# Patient Record
Sex: MALE | Race: WHITE | NOT HISPANIC OR LATINO | Employment: OTHER | URBAN - METROPOLITAN AREA
[De-identification: names, ages, dates, MRNs, and addresses within clinical notes are randomized per-mention and may not be internally consistent; named-entity substitution may affect disease eponyms.]

---

## 2024-05-30 PROBLEM — M51.26 LUMBAR HERNIATED DISC: Status: ACTIVE | Noted: 2024-05-30

## 2024-05-30 PROBLEM — M54.16 LUMBAR RADICULOPATHY: Status: ACTIVE | Noted: 2024-05-30

## 2024-05-30 PROBLEM — M47.816 LUMBAR FACET ARTHROPATHY: Status: ACTIVE | Noted: 2024-05-30

## 2024-05-30 PROBLEM — I10 HYPERTENSION: Status: ACTIVE | Noted: 2024-05-30

## 2024-05-30 PROBLEM — M79.18 MYOFASCIAL PAIN: Status: ACTIVE | Noted: 2024-05-30

## 2024-05-30 PROBLEM — M54.50 LOWER BACK PAIN: Status: ACTIVE | Noted: 2024-05-30

## 2024-06-05 ENCOUNTER — OFFICE VISIT (OUTPATIENT)
Dept: ORTHOPEDIC SURGERY | Facility: CLINIC | Age: 69
End: 2024-06-05
Payer: MEDICARE

## 2024-06-05 ENCOUNTER — HOSPITAL ENCOUNTER (OUTPATIENT)
Dept: RADIOLOGY | Facility: CLINIC | Age: 69
Discharge: HOME | End: 2024-06-05
Payer: MEDICARE

## 2024-06-05 VITALS — BODY MASS INDEX: 31.76 KG/M2 | WEIGHT: 214.95 LBS

## 2024-06-05 DIAGNOSIS — R52 PAIN: ICD-10-CM

## 2024-06-05 DIAGNOSIS — S83.8X1A SPRAIN OF MEDIAL MENISCUS OF RIGHT KNEE, INITIAL ENCOUNTER: ICD-10-CM

## 2024-06-05 DIAGNOSIS — M17.11 PRIMARY OSTEOARTHRITIS OF RIGHT KNEE: ICD-10-CM

## 2024-06-05 DIAGNOSIS — M25.561 RIGHT KNEE PAIN, UNSPECIFIED CHRONICITY: Primary | ICD-10-CM

## 2024-06-05 PROCEDURE — 2500000004 HC RX 250 GENERAL PHARMACY W/ HCPCS (ALT 636 FOR OP/ED): Performed by: ORTHOPAEDIC SURGERY

## 2024-06-05 PROCEDURE — 73560 X-RAY EXAM OF KNEE 1 OR 2: CPT | Mod: RIGHT SIDE | Performed by: ORTHOPAEDIC SURGERY

## 2024-06-05 PROCEDURE — 99203 OFFICE O/P NEW LOW 30 MIN: CPT | Performed by: ORTHOPAEDIC SURGERY

## 2024-06-05 PROCEDURE — 1160F RVW MEDS BY RX/DR IN RCRD: CPT | Performed by: ORTHOPAEDIC SURGERY

## 2024-06-05 PROCEDURE — 1159F MED LIST DOCD IN RCRD: CPT | Performed by: ORTHOPAEDIC SURGERY

## 2024-06-05 PROCEDURE — 2500000005 HC RX 250 GENERAL PHARMACY W/O HCPCS: Performed by: ORTHOPAEDIC SURGERY

## 2024-06-05 PROCEDURE — 99213 OFFICE O/P EST LOW 20 MIN: CPT | Performed by: ORTHOPAEDIC SURGERY

## 2024-06-05 PROCEDURE — 1125F AMNT PAIN NOTED PAIN PRSNT: CPT | Performed by: ORTHOPAEDIC SURGERY

## 2024-06-05 PROCEDURE — 73560 X-RAY EXAM OF KNEE 1 OR 2: CPT | Mod: RT

## 2024-06-05 PROCEDURE — 20610 DRAIN/INJ JOINT/BURSA W/O US: CPT | Performed by: ORTHOPAEDIC SURGERY

## 2024-06-05 RX ORDER — SILDENAFIL 100 MG/1
50-100 TABLET, FILM COATED ORAL
COMMUNITY
Start: 2024-03-19

## 2024-06-05 RX ORDER — SERTRALINE HYDROCHLORIDE 100 MG/1
100 TABLET, FILM COATED ORAL
COMMUNITY
Start: 2023-06-07 | End: 2024-06-06

## 2024-06-05 RX ORDER — LIDOCAINE HYDROCHLORIDE 10 MG/ML
1 INJECTION INFILTRATION; PERINEURAL
Status: COMPLETED | OUTPATIENT
Start: 2024-06-05 | End: 2024-06-05

## 2024-06-05 RX ORDER — LISINOPRIL 10 MG/1
10 TABLET ORAL
COMMUNITY
Start: 2023-06-07 | End: 2024-06-06

## 2024-06-05 RX ORDER — TRIAMCINOLONE ACETONIDE 40 MG/ML
40 INJECTION, SUSPENSION INTRA-ARTICULAR; INTRAMUSCULAR
Status: COMPLETED | OUTPATIENT
Start: 2024-06-05 | End: 2024-06-05

## 2024-06-05 RX ADMIN — TRIAMCINOLONE ACETONIDE 40 MG: 400 INJECTION, SUSPENSION INTRA-ARTICULAR; INTRAMUSCULAR at 14:45

## 2024-06-05 RX ADMIN — LIDOCAINE HYDROCHLORIDE 1 ML: 10 INJECTION, SOLUTION INFILTRATION; PERINEURAL at 14:45

## 2024-06-05 ASSESSMENT — PATIENT HEALTH QUESTIONNAIRE - PHQ9
1. LITTLE INTEREST OR PLEASURE IN DOING THINGS: NOT AT ALL
SUM OF ALL RESPONSES TO PHQ9 QUESTIONS 1 AND 2: 0
2. FEELING DOWN, DEPRESSED OR HOPELESS: NOT AT ALL

## 2024-06-05 ASSESSMENT — PAIN DESCRIPTION - DESCRIPTORS: DESCRIPTORS: SHARP

## 2024-06-05 ASSESSMENT — PAIN SCALES - GENERAL: PAINLEVEL_OUTOF10: 8

## 2024-06-05 ASSESSMENT — ENCOUNTER SYMPTOMS
DEPRESSION: 0
OCCASIONAL FEELINGS OF UNSTEADINESS: 0
LOSS OF SENSATION IN FEET: 0

## 2024-06-05 ASSESSMENT — PAIN - FUNCTIONAL ASSESSMENT: PAIN_FUNCTIONAL_ASSESSMENT: 0-10

## 2024-06-05 ASSESSMENT — LIFESTYLE VARIABLES: TOTAL SCORE: 0

## 2024-06-05 NOTE — PROGRESS NOTES
Subjective      Chief Complaint   Patient presents with    Right Knee - Pain        No surgery found     HPI  This 68 year old patient presents today with  right knee pain. The patient states that this right knee pain has been present for a year. He denies trauma or injury. The patient rates the knee pain as 8. The patient states that knee pain is worse with and aggravated by activity and bearing weight. The patient states the knee is giving way and locking.The patient has tried ibuprofen and tylenol with no relief. Patient requests a discussion of further treatment options on examination today.     CARDIOLOGY:   Negative for chest pain, shortness of breath.   RESPIRATORY:   Negative for chest pain, shortness of breath.   MUSCULOSKELETAL:   See HPI for details.   NEUROLOGY:   Negative for tingling, numbness, weakness.    Objective    There were no vitals filed for this visit.    Knee Exam  Constitutional: Appears stated age. No apparent distress  Labored Breathing: No  Psychiatric: Normal mood and effect.   Neurological: alert and oriented x3  Skin: intact  MUSCULOSKELETAL: Neck: No tenderness. No pain or limitation with range of motion. Back: No tenderness. Straight leg test negative bilaterally. right knee: There is diffuse tenderness over the knee. full range of motion McMurrays is positive. Anterior drawer and lachmans are negative. There is not an effusion present. The knee is stable to valgus and varus stressing. The patient walks with a painful gait favoring the right knee while walking.    Standing AP x-rays of both knees and lateral x-rays of both knees done and read in office today show mild osteoarthritis of the right knee.     Patient ID: Matt Wallis is a 68 y.o. male.    L Inj/Asp: R knee on 6/5/2024 2:45 PM  Indications: pain  Details: 22 G needle, medial approach  Medications: 40 mg triamcinolone acetonide 40 mg/mL; 1 mL lidocaine 10 mg/mL (1 %)  Outcome: tolerated well, no immediate  complications  Procedure, treatment alternatives, risks and benefits explained, specific risks discussed. Immediately prior to procedure a time out was called to verify the correct patient, procedure, equipment, support staff and site/side marked as required. Patient was prepped and draped in the usual sterile fashion.         Matt was seen today for pain.  Diagnoses and all orders for this visit:  Right knee pain, unspecified chronicity (Primary)  Primary osteoarthritis of right knee  Sprain of medial meniscus of right knee, initial encounter  Options are discussed with the patient in detail. The patient is instructed regarding activity modification, ice, physician directed at home gentle strengthening and ROM exercises, and the appropriate use of Tylenol as needed for pain with its potential adverse reactions and side effects. The patient understands. The patient states that despite all the treatment listed above that this right knee pain is debilitating and  requests a discussion of further options. Cortisone injection to the right knee is discussed in the office today. This is done in the office today. See procedures below. Return in approximately 3 months for reevaluation or sooner as needed, Please note that this report has been produced using speech recognition software.  It may contain errors related to grammar, punctuation or spelling.  Electronically signed, but not reviewed.       Galindo Camejo MD

## 2024-06-05 NOTE — PATIENT INSTRUCTIONS
Thank you for coming to see us today!     Continue to use tylenol for pain control.   Rest, ice and elevate and remember to do exercises.     Follow up  in 4 weeks for MRI evaluation or sooner as needed

## 2024-10-09 ENCOUNTER — APPOINTMENT (OUTPATIENT)
Dept: ALLERGY | Facility: CLINIC | Age: 69
End: 2024-10-09
Payer: MEDICARE

## 2024-11-20 ENCOUNTER — APPOINTMENT (OUTPATIENT)
Dept: ALLERGY | Facility: CLINIC | Age: 69
End: 2024-11-20
Payer: MEDICARE

## 2025-01-15 ENCOUNTER — APPOINTMENT (OUTPATIENT)
Dept: ALLERGY | Facility: CLINIC | Age: 70
End: 2025-01-15
Payer: MEDICARE

## 2025-01-15 VITALS
HEART RATE: 96 BPM | BODY MASS INDEX: 31.48 KG/M2 | WEIGHT: 213 LBS | SYSTOLIC BLOOD PRESSURE: 134 MMHG | DIASTOLIC BLOOD PRESSURE: 86 MMHG | OXYGEN SATURATION: 98 %

## 2025-01-15 DIAGNOSIS — J32.8 OTHER CHRONIC SINUSITIS: ICD-10-CM

## 2025-01-15 DIAGNOSIS — J31.0 CHRONIC RHINITIS: Primary | ICD-10-CM

## 2025-01-15 PROCEDURE — 99204 OFFICE O/P NEW MOD 45 MIN: CPT | Performed by: ALLERGY & IMMUNOLOGY

## 2025-01-15 PROCEDURE — 95004 PERQ TESTS W/ALRGNC XTRCS: CPT | Performed by: ALLERGY & IMMUNOLOGY

## 2025-01-15 RX ORDER — IPRATROPIUM BROMIDE 42 UG/1
1-2 SPRAY, METERED NASAL 2 TIMES DAILY PRN
Qty: 15 ML | Refills: 11 | Status: SHIPPED | OUTPATIENT
Start: 2025-01-15 | End: 2025-02-05

## 2025-01-15 NOTE — PROGRESS NOTES
Subjective   Patient ID:   [id number]   Matt Wallis is a 69 y.o. male who presents for Allergy Testing.    Chief Complaint   Patient presents with    Allergy Testing          HPI  This patient is here to evaluate for:  Runny eyes, post nasal drainage,   With barometric pressure, he had congestion and post nasal drainage    Started for 10+ years.     Medications that have not helped include: claritin, benadryl, allergra, zyrtec,   No help with flonase.     ?astepro or nasacort.    Watery eyes, has seen the eye doctor.  Opened up his tear ducts - helped a lot but not fully.    Sh: no woodworking or dust exposure  He cuts grass and is outdoors working on his 1 acre lawn.  Smokes 1 1/2 ppd - he is thinking about quitting      Review of Systems   All other systems reviewed and are negative.        Objective     /86 (BP Location: Right arm, Patient Position: Sitting)   Pulse 96   Wt 96.6 kg (213 lb)   SpO2 98%   BMI 31.48 kg/m²      Physical Exam  Constitutional:       General: He is not in acute distress.     Appearance: Normal appearance. He is not ill-appearing.   HENT:      Head: Normocephalic and atraumatic.      Right Ear: Tympanic membrane, ear canal and external ear normal.      Left Ear: Tympanic membrane, ear canal and external ear normal.      Nose: Nose normal. No congestion or rhinorrhea.      Mouth/Throat:      Mouth: Mucous membranes are moist.      Pharynx: Oropharynx is clear. No oropharyngeal exudate or posterior oropharyngeal erythema.   Eyes:      General:         Right eye: No discharge.         Left eye: No discharge.      Conjunctiva/sclera: Conjunctivae normal.   Cardiovascular:      Rate and Rhythm: Normal rate and regular rhythm.      Heart sounds: Normal heart sounds. No murmur heard.     No friction rub. No gallop.   Pulmonary:      Effort: Pulmonary effort is normal. No respiratory distress.      Breath sounds: Normal breath sounds. No stridor. No wheezing, rhonchi or rales.   Chest:       Chest wall: No tenderness.   Abdominal:      General: Abdomen is flat.      Palpations: Abdomen is soft.   Musculoskeletal:         General: Normal range of motion.      Cervical back: Normal range of motion and neck supple.   Lymphadenopathy:      Cervical: No cervical adenopathy.   Skin:     General: Skin is warm and dry.      Findings: No erythema, lesion or rash.   Neurological:      General: No focal deficit present.      Mental Status: He is alert. Mental status is at baseline.   Psychiatric:         Mood and Affect: Mood normal.         Behavior: Behavior normal.         Thought Content: Thought content normal.         Judgment: Judgment normal.            Current Outpatient Medications   Medication Sig Dispense Refill    sildenafil (Viagra) 100 mg tablet Take 0.5-1 tablets ( mg) by mouth.      lisinopril 10 mg tablet Take 1 tablet (10 mg) by mouth once daily.      sertraline (Zoloft) 100 mg tablet Take 1 tablet (100 mg) by mouth once daily.       No current facility-administered medications for this visit.       Summary of the labs over the past 6 months:    No visits with results within 6 Month(s) from this visit.   Latest known visit with results is:   [facility] Encounter on 12/13/2018   Component Date Value Ref Range Status    Ventricular Rate 12/13/2018 72  BPM Final    Atrial Rate 12/13/2018 72  BPM Final    N[place] Interval 12/13/2018 138  ms Final    QRS Duration 12/13/2018 86  ms Final    QT Interval 12/13/2018 366  ms Final    QTC Calculation(Bazett) 12/13/2018 400  ms Final    P Axis 12/13/2018 68  degrees Final    R Axis 12/13/2018 26  degrees Final    T Axis 12/13/2018 47  degrees Final    QRS Count 12/13/2018 11  beats Final    Q Onset 12/13/2018 227  ms Final    P Onset 12/13/2018 158  ms Final    P Offset 12/13/2018 205  ms Final    T Offset 12/13/2018 410  ms Final    QTC Fredericia 12/13/2018 389  ms Final    WBC 12/13/2018 5.9  4.4 - 11.3 x10E9/L Final    RBC 12/13/2018 4.80  4.50 - 5.90  x10E12/L Final    Hemoglobin 12/13/2018 15.2  13.5 - 17.5 g/dL Final    Hematocrit 12/13/2018 44.9  41.0 - 52.0 % Final    MCV 12/13/2018 94  80 - 100 fL Final    MCHC 12/13/2018 33.9  32.0 - 36.0 g/dL Final    Platelets 12/13/2018 215  150 - 450 x10E9/L Final    RDW 12/13/2018 12.6  11.5 - 14.5 % Final    Neutrophils % 12/13/2018 68.0  40.0 - 80.0 % Final    Immature Granulocytes %, Automated 12/13/2018 0.3  0.0 - 0.9 % Final    Lymphocytes % 12/13/2018 21.6  13.0 - 44.0 % Final    Monocytes % 12/13/2018 6.3  2.0 - 10.0 % Final    Eosinophils % 12/13/2018 3.0  0.0 - 6.0 % Final    Basophils % 12/13/2018 0.8  0.0 - 2.0 % Final    Neutrophils Absolute 12/13/2018 4.02  1.20 - 7.70 x10E9/L Final    Lymphocytes Absolute 12/13/2018 1.28  1.20 - 4.80 x10E9/L Final    Monocytes Absolute 12/13/2018 0.37  0.10 - 1.00 x10E9/L Final    Eosinophils Absolute 12/13/2018 0.18  0.00 - 0.70 x10E9/L Final    Basophils Absolute 12/13/2018 0.05  0.00 - 0.10 x10E9/L Final    Color, Urine 12/13/2018 YELLOW  STRAW,YELLOW Final    Appearance, Urine 12/13/2018 CLEAR  CLEAR Final    Specific Gravity, Urine 12/13/2018 1.008  1.005 - 1.035 Final    pH, Urine 12/13/2018 5.0  5.0 - 8.0 Final    Protein, Urine 12/13/2018 NEGATIVE  NEGATIVE mg/dL Final    Glucose, Urine 12/13/2018 NEGATIVE  NEGATIVE mg/dL Final    Blood, Urine 12/13/2018 NEGATIVE  NEGATIVE Final    Ketones, Urine 12/13/2018 NEGATIVE  NEGATIVE mg/dL Final    Bilirubin, Urine 12/13/2018 NEGATIVE  NEGATIVE Final    Urobilinogen, Urine 12/13/2018 <2.0  0.0 - 1.9 mg/dL Final    Nitrite, Urine 12/13/2018 NEGATIVE  NEGATIVE Final    Leukocyte Esterase, Urine 12/13/2018 NEGATIVE  NEGATIVE Final    Glucose 12/13/2018 93  74 - 99 mg/dL Final    Sodium 12/13/2018 138  136 - 145 mmol/L Final    Potassium 12/13/2018 3.9  3.5 - 5.3 mmol/L Final    Chloride 12/13/2018 106  98 - 107 mmol/L Final    Bicarbonate 12/13/2018 25  21 - 32 mmol/L Final    Anion Gap 12/13/2018 11  10 - 20 mmol/L Final     Urea Nitrogen 12/13/2018 18  6 - 23 mg/dL Final    Creatinine 12/13/2018 1.00  0.50 - 1.30 mg/dL Final    GLOMERULAR FILTRATION RATE-NON AFR* 12/13/2018 >60  >60 mL/min/1.73m2 Final    GLOMERULAR FILTRATION RATE-* 12/13/2018 >60  >60 mL/min/1.73m2 Final    Calcium 12/13/2018 9.4  8.6 - 10.3 mg/dL Final    ABO GROUP (TYPE) IN BLOOD 12/21/2018 O   Final    Rh 12/21/2018 POS   Final    ABO GROUP (TYPE) IN BLOOD 12/13/2018 O   Final    Rh 12/13/2018 POS   Final    ANTIBODY SCREEN 12/13/2018 NEG   Final    Protime 12/13/2018 10.5  9.7 - 12.7 sec Final    INR 12/13/2018 1.0  0.9 - 1.1 Final    aPTT 12/13/2018 36  28 - 38 sec Final         Assessment/Plan       We performed allergy testing to help determine the etiology of your symptoms. We discussed the results of the testing. Also, we started to focus on treating your problem and we reviewed the management plan.    The allergy testing was negative to common environmental allergy triggers. This means that IgE-mediated allergy is not causing your symptoms.    We reviewed how to use the nasal spray. Proper usage is important in order to deliver the medicine to where it needs to work and to avoid side effects or nasal irritation. Bend your head down looking at your toes because the nasal passageway goes straight back, not up. Point the spray at 10 and 2 o'clock and breath in through your nose just slightly. You will want to avoid the center part of the nose (septum). It may help to look in the mirror to check your technique. The medicine should not drip out but it is normal to taste it after several minutes.     Pedro Salcido MD